# Patient Record
Sex: FEMALE | Race: WHITE | URBAN - METROPOLITAN AREA
[De-identification: names, ages, dates, MRNs, and addresses within clinical notes are randomized per-mention and may not be internally consistent; named-entity substitution may affect disease eponyms.]

---

## 2023-02-26 ENCOUNTER — INPATIENT (INPATIENT)
Facility: HOSPITAL | Age: 28
LOS: 0 days | Discharge: HOME | DRG: 465 | End: 2023-02-27
Attending: UROLOGY | Admitting: UROLOGY
Payer: MEDICAID

## 2023-02-26 VITALS
WEIGHT: 149.91 LBS | RESPIRATION RATE: 18 BRPM | HEART RATE: 87 BPM | DIASTOLIC BLOOD PRESSURE: 90 MMHG | SYSTOLIC BLOOD PRESSURE: 155 MMHG | TEMPERATURE: 99 F | OXYGEN SATURATION: 97 %

## 2023-02-26 DIAGNOSIS — N20.0 CALCULUS OF KIDNEY: ICD-10-CM

## 2023-02-26 DIAGNOSIS — Z98.891 HISTORY OF UTERINE SCAR FROM PREVIOUS SURGERY: Chronic | ICD-10-CM

## 2023-02-26 DIAGNOSIS — Z90.49 ACQUIRED ABSENCE OF OTHER SPECIFIED PARTS OF DIGESTIVE TRACT: Chronic | ICD-10-CM

## 2023-02-26 LAB
ALBUMIN SERPL ELPH-MCNC: 4.6 G/DL — SIGNIFICANT CHANGE UP (ref 3.5–5.2)
ALP SERPL-CCNC: 123 U/L — HIGH (ref 30–115)
ALT FLD-CCNC: 22 U/L — SIGNIFICANT CHANGE UP (ref 0–41)
ANION GAP SERPL CALC-SCNC: 9 MMOL/L — SIGNIFICANT CHANGE UP (ref 7–14)
APPEARANCE UR: ABNORMAL
AST SERPL-CCNC: 18 U/L — SIGNIFICANT CHANGE UP (ref 0–41)
BACTERIA # UR AUTO: NEGATIVE — SIGNIFICANT CHANGE UP
BASOPHILS # BLD AUTO: 0.02 K/UL — SIGNIFICANT CHANGE UP (ref 0–0.2)
BASOPHILS NFR BLD AUTO: 0.1 % — SIGNIFICANT CHANGE UP (ref 0–1)
BILIRUB SERPL-MCNC: 0.6 MG/DL — SIGNIFICANT CHANGE UP (ref 0.2–1.2)
BILIRUB UR-MCNC: NEGATIVE — SIGNIFICANT CHANGE UP
BUN SERPL-MCNC: 14 MG/DL — SIGNIFICANT CHANGE UP (ref 10–20)
CALCIUM SERPL-MCNC: 9.2 MG/DL — SIGNIFICANT CHANGE UP (ref 8.4–10.4)
CHLORIDE SERPL-SCNC: 100 MMOL/L — SIGNIFICANT CHANGE UP (ref 98–110)
CO2 SERPL-SCNC: 27 MMOL/L — SIGNIFICANT CHANGE UP (ref 17–32)
COLOR SPEC: ABNORMAL
CREAT SERPL-MCNC: 1.2 MG/DL — SIGNIFICANT CHANGE UP (ref 0.7–1.5)
DIFF PNL FLD: ABNORMAL
EGFR: 64 ML/MIN/1.73M2 — SIGNIFICANT CHANGE UP
EOSINOPHIL # BLD AUTO: 0.04 K/UL — SIGNIFICANT CHANGE UP (ref 0–0.7)
EOSINOPHIL NFR BLD AUTO: 0.3 % — SIGNIFICANT CHANGE UP (ref 0–8)
EPI CELLS # UR: 3 /HPF — SIGNIFICANT CHANGE UP (ref 0–5)
GLUCOSE SERPL-MCNC: 104 MG/DL — HIGH (ref 70–99)
GLUCOSE UR QL: NEGATIVE — SIGNIFICANT CHANGE UP
HCG SERPL QL: NEGATIVE — SIGNIFICANT CHANGE UP
HCT VFR BLD CALC: 37.5 % — SIGNIFICANT CHANGE UP (ref 37–47)
HGB BLD-MCNC: 13.2 G/DL — SIGNIFICANT CHANGE UP (ref 12–16)
HYALINE CASTS # UR AUTO: 0 /LPF — SIGNIFICANT CHANGE UP (ref 0–7)
IMM GRANULOCYTES NFR BLD AUTO: 0.3 % — SIGNIFICANT CHANGE UP (ref 0.1–0.3)
KETONES UR-MCNC: NEGATIVE — SIGNIFICANT CHANGE UP
LEUKOCYTE ESTERASE UR-ACNC: ABNORMAL
LIDOCAIN IGE QN: 32 U/L — SIGNIFICANT CHANGE UP (ref 7–60)
LYMPHOCYTES # BLD AUTO: 1.92 K/UL — SIGNIFICANT CHANGE UP (ref 1.2–3.4)
LYMPHOCYTES # BLD AUTO: 13 % — LOW (ref 20.5–51.1)
MCHC RBC-ENTMCNC: 29.7 PG — SIGNIFICANT CHANGE UP (ref 27–31)
MCHC RBC-ENTMCNC: 35.2 G/DL — SIGNIFICANT CHANGE UP (ref 32–37)
MCV RBC AUTO: 84.5 FL — SIGNIFICANT CHANGE UP (ref 81–99)
MONOCYTES # BLD AUTO: 0.93 K/UL — HIGH (ref 0.1–0.6)
MONOCYTES NFR BLD AUTO: 6.3 % — SIGNIFICANT CHANGE UP (ref 1.7–9.3)
NEUTROPHILS # BLD AUTO: 11.83 K/UL — HIGH (ref 1.4–6.5)
NEUTROPHILS NFR BLD AUTO: 80 % — HIGH (ref 42.2–75.2)
NITRITE UR-MCNC: NEGATIVE — SIGNIFICANT CHANGE UP
NRBC # BLD: 0 /100 WBCS — SIGNIFICANT CHANGE UP (ref 0–0)
PH UR: 6.5 — SIGNIFICANT CHANGE UP (ref 5–8)
PLATELET # BLD AUTO: 349 K/UL — SIGNIFICANT CHANGE UP (ref 130–400)
POTASSIUM SERPL-MCNC: 4.4 MMOL/L — SIGNIFICANT CHANGE UP (ref 3.5–5)
POTASSIUM SERPL-SCNC: 4.4 MMOL/L — SIGNIFICANT CHANGE UP (ref 3.5–5)
PROT SERPL-MCNC: 7.1 G/DL — SIGNIFICANT CHANGE UP (ref 6–8)
PROT UR-MCNC: ABNORMAL
RBC # BLD: 4.44 M/UL — SIGNIFICANT CHANGE UP (ref 4.2–5.4)
RBC # FLD: 11.4 % — LOW (ref 11.5–14.5)
RBC CASTS # UR COMP ASSIST: >720 /HPF — HIGH (ref 0–4)
SARS-COV-2 RNA SPEC QL NAA+PROBE: SIGNIFICANT CHANGE UP
SODIUM SERPL-SCNC: 136 MMOL/L — SIGNIFICANT CHANGE UP (ref 135–146)
SP GR SPEC: 1.02 — SIGNIFICANT CHANGE UP (ref 1.01–1.03)
UROBILINOGEN FLD QL: SIGNIFICANT CHANGE UP
WBC # BLD: 14.79 K/UL — HIGH (ref 4.8–10.8)
WBC # FLD AUTO: 14.79 K/UL — HIGH (ref 4.8–10.8)
WBC UR QL: 14 /HPF — HIGH (ref 0–5)

## 2023-02-26 PROCEDURE — 74176 CT ABD & PELVIS W/O CONTRAST: CPT | Mod: 26,MA

## 2023-02-26 PROCEDURE — 99285 EMERGENCY DEPT VISIT HI MDM: CPT | Mod: 25

## 2023-02-26 PROCEDURE — U0005: CPT

## 2023-02-26 PROCEDURE — C1758: CPT

## 2023-02-26 PROCEDURE — 85025 COMPLETE CBC W/AUTO DIFF WBC: CPT

## 2023-02-26 PROCEDURE — U0003: CPT

## 2023-02-26 PROCEDURE — 87086 URINE CULTURE/COLONY COUNT: CPT

## 2023-02-26 PROCEDURE — 76775 US EXAM ABDO BACK WALL LIM: CPT | Mod: 26

## 2023-02-26 PROCEDURE — 52332 CYSTOSCOPY AND TREATMENT: CPT | Mod: RT

## 2023-02-26 PROCEDURE — 99221 1ST HOSP IP/OBS SF/LOW 40: CPT | Mod: 25

## 2023-02-26 PROCEDURE — 36415 COLL VENOUS BLD VENIPUNCTURE: CPT

## 2023-02-26 PROCEDURE — 74420 UROGRAPHY RTRGR +-KUB: CPT | Mod: 26,RT

## 2023-02-26 PROCEDURE — 80048 BASIC METABOLIC PNL TOTAL CA: CPT

## 2023-02-26 PROCEDURE — 74018 RADEX ABDOMEN 1 VIEW: CPT

## 2023-02-26 PROCEDURE — C1769: CPT

## 2023-02-26 PROCEDURE — C2617: CPT

## 2023-02-26 RX ORDER — TAMSULOSIN HYDROCHLORIDE 0.4 MG/1
0.4 CAPSULE ORAL DAILY
Refills: 0 | Status: DISCONTINUED | OUTPATIENT
Start: 2023-02-26 | End: 2023-02-27

## 2023-02-26 RX ORDER — LISINOPRIL 2.5 MG/1
10 TABLET ORAL DAILY
Refills: 0 | Status: DISCONTINUED | OUTPATIENT
Start: 2023-02-26 | End: 2023-02-26

## 2023-02-26 RX ORDER — ONDANSETRON 8 MG/1
4 TABLET, FILM COATED ORAL EVERY 6 HOURS
Refills: 0 | Status: DISCONTINUED | OUTPATIENT
Start: 2023-02-26 | End: 2023-02-26

## 2023-02-26 RX ORDER — LISINOPRIL 2.5 MG/1
1 TABLET ORAL
Qty: 0 | Refills: 0 | DISCHARGE

## 2023-02-26 RX ORDER — ONDANSETRON 8 MG/1
4 TABLET, FILM COATED ORAL ONCE
Refills: 0 | Status: COMPLETED | OUTPATIENT
Start: 2023-02-26 | End: 2023-02-26

## 2023-02-26 RX ORDER — ACETAMINOPHEN 500 MG
650 TABLET ORAL EVERY 6 HOURS
Refills: 0 | Status: DISCONTINUED | OUTPATIENT
Start: 2023-02-26 | End: 2023-02-26

## 2023-02-26 RX ORDER — MORPHINE SULFATE 50 MG/1
4 CAPSULE, EXTENDED RELEASE ORAL EVERY 6 HOURS
Refills: 0 | Status: DISCONTINUED | OUTPATIENT
Start: 2023-02-26 | End: 2023-02-26

## 2023-02-26 RX ORDER — MORPHINE SULFATE 50 MG/1
4 CAPSULE, EXTENDED RELEASE ORAL ONCE
Refills: 0 | Status: DISCONTINUED | OUTPATIENT
Start: 2023-02-26 | End: 2023-02-26

## 2023-02-26 RX ORDER — KETOROLAC TROMETHAMINE 30 MG/ML
15 SYRINGE (ML) INJECTION EVERY 6 HOURS
Refills: 0 | Status: DISCONTINUED | OUTPATIENT
Start: 2023-02-26 | End: 2023-02-26

## 2023-02-26 RX ORDER — OXYCODONE HYDROCHLORIDE 5 MG/1
5 TABLET ORAL ONCE
Refills: 0 | Status: DISCONTINUED | OUTPATIENT
Start: 2023-02-26 | End: 2023-02-27

## 2023-02-26 RX ORDER — KETOROLAC TROMETHAMINE 30 MG/ML
15 SYRINGE (ML) INJECTION ONCE
Refills: 0 | Status: DISCONTINUED | OUTPATIENT
Start: 2023-02-26 | End: 2023-02-26

## 2023-02-26 RX ORDER — SODIUM CHLORIDE 9 MG/ML
1000 INJECTION, SOLUTION INTRAVENOUS
Refills: 0 | Status: DISCONTINUED | OUTPATIENT
Start: 2023-02-26 | End: 2023-02-27

## 2023-02-26 RX ORDER — ACETAMINOPHEN 500 MG
1000 TABLET ORAL ONCE
Refills: 0 | Status: COMPLETED | OUTPATIENT
Start: 2023-02-26 | End: 2023-02-27

## 2023-02-26 RX ORDER — SODIUM CHLORIDE 9 MG/ML
1000 INJECTION INTRAMUSCULAR; INTRAVENOUS; SUBCUTANEOUS ONCE
Refills: 0 | Status: COMPLETED | OUTPATIENT
Start: 2023-02-26 | End: 2023-02-26

## 2023-02-26 RX ORDER — SODIUM CHLORIDE 9 MG/ML
1000 INJECTION INTRAMUSCULAR; INTRAVENOUS; SUBCUTANEOUS
Refills: 0 | Status: DISCONTINUED | OUTPATIENT
Start: 2023-02-26 | End: 2023-02-27

## 2023-02-26 RX ORDER — KETOROLAC TROMETHAMINE 30 MG/ML
15 SYRINGE (ML) INJECTION EVERY 6 HOURS
Refills: 0 | Status: DISCONTINUED | OUTPATIENT
Start: 2023-02-26 | End: 2023-02-27

## 2023-02-26 RX ORDER — HYDROMORPHONE HYDROCHLORIDE 2 MG/ML
0.5 INJECTION INTRAMUSCULAR; INTRAVENOUS; SUBCUTANEOUS
Refills: 0 | Status: DISCONTINUED | OUTPATIENT
Start: 2023-02-26 | End: 2023-02-27

## 2023-02-26 RX ORDER — INFLUENZA VIRUS VACCINE 15; 15; 15; 15 UG/.5ML; UG/.5ML; UG/.5ML; UG/.5ML
0.5 SUSPENSION INTRAMUSCULAR ONCE
Refills: 0 | Status: DISCONTINUED | OUTPATIENT
Start: 2023-02-26 | End: 2023-02-27

## 2023-02-26 RX ORDER — SODIUM CHLORIDE 9 MG/ML
1000 INJECTION INTRAMUSCULAR; INTRAVENOUS; SUBCUTANEOUS
Refills: 0 | Status: DISCONTINUED | OUTPATIENT
Start: 2023-02-26 | End: 2023-02-26

## 2023-02-26 RX ADMIN — Medication 15 MILLIGRAM(S): at 06:24

## 2023-02-26 RX ADMIN — Medication 15 MILLIGRAM(S): at 20:10

## 2023-02-26 RX ADMIN — SODIUM CHLORIDE 1000 MILLILITER(S): 9 INJECTION INTRAMUSCULAR; INTRAVENOUS; SUBCUTANEOUS at 05:30

## 2023-02-26 RX ADMIN — Medication 15 MILLIGRAM(S): at 11:04

## 2023-02-26 RX ADMIN — ONDANSETRON 4 MILLIGRAM(S): 8 TABLET, FILM COATED ORAL at 11:05

## 2023-02-26 RX ADMIN — MORPHINE SULFATE 4 MILLIGRAM(S): 50 CAPSULE, EXTENDED RELEASE ORAL at 06:35

## 2023-02-26 RX ADMIN — MORPHINE SULFATE 4 MILLIGRAM(S): 50 CAPSULE, EXTENDED RELEASE ORAL at 08:53

## 2023-02-26 RX ADMIN — ONDANSETRON 4 MILLIGRAM(S): 8 TABLET, FILM COATED ORAL at 06:35

## 2023-02-26 RX ADMIN — SODIUM CHLORIDE 1000 MILLILITER(S): 9 INJECTION INTRAMUSCULAR; INTRAVENOUS; SUBCUTANEOUS at 09:10

## 2023-02-26 RX ADMIN — SODIUM CHLORIDE 125 MILLILITER(S): 9 INJECTION INTRAMUSCULAR; INTRAVENOUS; SUBCUTANEOUS at 17:20

## 2023-02-26 RX ADMIN — Medication 15 MILLIGRAM(S): at 19:33

## 2023-02-26 RX ADMIN — Medication 15 MILLIGRAM(S): at 05:30

## 2023-02-26 RX ADMIN — HYDROMORPHONE HYDROCHLORIDE 0.5 MILLIGRAM(S): 2 INJECTION INTRAMUSCULAR; INTRAVENOUS; SUBCUTANEOUS at 15:53

## 2023-02-26 RX ADMIN — SODIUM CHLORIDE 125 MILLILITER(S): 9 INJECTION INTRAMUSCULAR; INTRAVENOUS; SUBCUTANEOUS at 11:07

## 2023-02-26 RX ADMIN — TAMSULOSIN HYDROCHLORIDE 0.4 MILLIGRAM(S): 0.4 CAPSULE ORAL at 21:45

## 2023-02-26 RX ADMIN — ONDANSETRON 4 MILLIGRAM(S): 8 TABLET, FILM COATED ORAL at 15:52

## 2023-02-26 NOTE — PATIENT PROFILE ADULT - FUNCTIONAL ASSESSMENT - BASIC MOBILITY 1.
Chief Complaint   Patient presents with     Blood Draw     Labs drawn via CVC by RN. Caps changed. Due for a dressing change. VS taken.     Sandra Flores RN     4 = No assist / stand by assistance

## 2023-02-26 NOTE — PATIENT PROFILE ADULT - FALL HARM RISK - FACTORS NURSING JUDGEMENT
No
Patient more than 12 hours s/p frontal injury. C/o worsening "throbbing" headache. Normal neuro exam. Will get CT and refer to concussion center if negative

## 2023-02-26 NOTE — H&P ADULT - ASSESSMENT
28y/o F with intractable pain and moderate right hydroureteronephrosis 2/2 an 8 x 4 x 7 RIGHT distal ureteral stone     - Case discussed with Dr. Sal, plan as per attending   - Admit to UROLOGY  - Booked in OR for cystoscopy, placement of RIGHT JJ stent   - Continue NPO (NPO since last night)  - Flomax  - Analgesia, Antiemetics prn   - Strain all urine  - COVID swab was sent by ED .

## 2023-02-26 NOTE — ED PROVIDER NOTE - PHYSICAL EXAMINATION
CONSTITUTIONAL: Well-developed; well-nourished  SKIN: warm, dry  HEAD: Normocephalic; atraumatic.  EYES: PERRL, EOMI, normal sclera and conjunctiva   ENT: No nasal discharge; airway clear.  NECK: Supple; non tender.  CARD: S1, S2 normal; no murmurs, gallops, or rubs. Regular rate and rhythm.   RESP: No wheezes, rales or rhonchi.  ABD: soft ntnd  EXT: Normal ROM.  No clubbing, cyanosis or edema.   LYMPH: No acute cervical adenopathy.  NEURO: Alert, oriented, grossly unremarkable  PSYCH: Cooperative, appropriate.

## 2023-02-26 NOTE — BRIEF OPERATIVE NOTE - NSICDXBRIEFPROCEDURE_GEN_ALL_CORE_FT
PROCEDURES:  Cystoureteroscopy, with retrograde pyelogram and stent insertion 26-Feb-2023 14:45:34  Chilango AMAYA

## 2023-02-26 NOTE — PRE-ANESTHESIA EVALUATION ADULT - TEMPERATURE IN CELSIUS (DEGREES C)
Labs today - screening inflammation, metabolic, and autoimmune including thyroid and celiac disease.     KUB today - if large stool burden, will prescribe a clean out as below.     If H pylori is positive, will treat with antibiotics and PPI.     If H pylori negative, will start Nexium 40 mg once a day in the am. Take the medication at least 30 mins prior to eating. The medication works much better went taken on an empty stomach.     If pain persists after 4 weeks on the Nexium, will set up EGD.     Alli Allred MD    For constipation:  Treating constipation is two-fold - first you need to remove the stool burden that has already built up and then you need a good maintenance plan to prevent recurrence.      To remove the current stool burden, do a clean-out at home.    1. Mix 15 capfuls of Miralax with 64 ounces of Gatorade   2. Then take 2 square of Ex-Lax (30 mg senna) and repeat 2 square (30 mg senna) 2 hours later   3. Drink Miralax/Gatorade mixture over the next 2 hours.   (Can keep portion not being drank in the fridge, or else it becomes a little grainy.)    This plan will make her poop for a few hours - you should do this on a day when she can be near home and near a bathroom.  4. This process may need to be repeated every day for up to a total of 3 days.  The goal is to have chicken broth consistency stools.    For Patricia's constipation maintenance plan (i.e.daily plan to prevent constipation), you should:   · Miralax 1 capful per day  · Goal is a soft, easy to pass BM at least once daily.   (Poop that looks like logs with cracks, bumpy logs, or individual balls/pellets of stools is too constipated)   · This is a safe medication that you can easily adjust at home to achieve the desired effect.        To decrease the dose for loose stools, decrease by 1/2 capful per day no more often than every 3 days as needed.        To increase the dose for hard stool, you can increase by 1/2 to 1 full capful per  day every day as needed.      · Patricia will need to be on Miralax daily to prevent constipation for a minimum of 3-6 months; if after that time, her diet and fluid intake have changed and she is not needing very much Miralax (1/2 capful per day or less), we can try discontinuing this medication.     · If Patricia continues to require more than 1 capful of Miralax per day after 3-6 months, we should reassess his constipation and make sure there's not something more going on.    Behaviors to help treat constipation   There is a natural reflex in the body to have a bowel movement after eating. To take advantage of this, Patricia  should sit on the toilet for at least 5 minutes after every meal.       High Fiber diet: Patricia needs a high fiber diet.  Ideally, this comes from a diet high in fruits, vegetables, and whole grains versus from a fiber supplement or product.  Try to eat at least 5 servings of fruits or vegetables per day.  Focus on choosing a fruit or vegetable with every meal and snack.    Limit dairy to 2-3 servings (<24oz) daily.    Fluids: Encourage your child to drink fluids throughout the day.  It can be helpful to have a water bottle at home and school to encourage good fluid intake. Goal is usually > 50 oz a day.     Remember the 5-2-1-0 rule for good health! Try to eat 5 servings of fruits or vegetables every day, no more than 2 hours of screen time daily (includes TV, video games and hand held games/phone), and one hour of physical activity daily!    Check out the website www.health.org/ee8645 for more resources on healthy eating and activity.    For more information, consider watching this 5 minute video: www.gikids.org \"The Poo In You.\" It explains the problems with constipation and how to fix it.  Have your child watch this with you.     37.3

## 2023-02-26 NOTE — ED ADULT TRIAGE NOTE - CHIEF COMPLAINT QUOTE
pt c/o right sided flank pain. pt was told she has two kidney stones last about 2 weeks ago and the pain feel similar.

## 2023-02-26 NOTE — CHART NOTE - NSCHARTNOTEFT_GEN_A_CORE
PACU ANESTHESIA ADMISSION NOTE      Procedure: Cystoureteroscopy, with retrograde pyelogram and stent insertion      Post op diagnosis:  Right ureteral calculus    Renal colic on right side      __x__  Patent Airway    _x___  Full return of protective reflexes    ____  Full recovery from anesthesia / back to baseline     Vitals:  see anesthesia record    Mental Status:  _x___ Awake   _____ Alert   _____ Drowsy   _____ Sedated    Nausea/Vomiting:  __x__ NO  ______Yes,   See Post - Op Orders          Pain Scale (0-10):  _____    Treatment: ____ None    ___x_ See Post - Op/PCA Orders    Post - Operative Fluids:   ____ Oral   __x__ See Post - Op Orders    Plan: Discharge:   _x___Home       _____Floor     _____Critical Care    _____  Other:_________________    Comments:  Uneventful intraoperative course. No anesthesia issues or complications noted. Patient stable upon arrival to PACU. Report given to RN. Discharge when criteria met.

## 2023-02-26 NOTE — ED ADULT NURSE NOTE - OBJECTIVE STATEMENT
pt c/o right sided flank pain, pain feels similar to when she had gallstones. Pt also has menstrual cycle right now, so unsure if pain is related to that.

## 2023-02-26 NOTE — ED PROVIDER NOTE - PROGRESS NOTE DETAILS
27-year-old female presented today with right-sided flank pain with suspected kidney stone.  Patient is hemodynamically stable and well-appearing on evaluation.  Patient has leukocytosis on labs and has continued abdominal pain despite medication administration.  Patient's bedside ultrasound was indicative of right-sided renal calyces dilation and a CT scan was ordered to evaluate for underlying etiology of possible large stone.  Patient will be signed out pending CAT scan results and disposition. ccruz - pt signed out to me by Dr Jerome. p/w R flank pain and n/v. pain resolved now, feels better, pending CT and UA re-eval.  consult placed on teams. pain returned, morphine ordered. 0.8 x0.4 x 0.7 distal ureter stone. UA pending, another IVF bolus ordered. UA large leuks, d/w DOROTEO CARDOZO [id number], states no abx for now. will give intraop.

## 2023-02-26 NOTE — BRIEF OPERATIVE NOTE - NSICDXBRIEFPREOP_GEN_ALL_CORE_FT
PRE-OP DIAGNOSIS:  Right ureteral calculus 26-Feb-2023 14:46:22  Chilango AMAYA  Renal colic on right side 26-Feb-2023 14:47:09  Chilango AMAYA

## 2023-02-26 NOTE — PATIENT PROFILE ADULT - FALL HARM RISK - RISK INTERVENTIONS
Assistance OOB with selected safe patient handling equipment/Assistance with ambulation/Communicate Fall Risk and Risk Factors to all staff, patient, and family/Monitor gait and stability/Reinforce activity limits and safety measures with patient and family/Sit up slowly, dangle for a short time, stand at bedside before walking/Use of alarms - bed, chair and/or voice tab/Visual Cue: Yellow wristband/Bed in lowest position, wheels locked, appropriate side rails in place/Call bell, personal items and telephone in reach/Instruct patient to call for assistance before getting out of bed or chair/Non-slip footwear when patient is out of bed/[place] to call system/Physically safe environment - no spills, clutter or unnecessary equipment/Purposeful Proactive Rounding/Room/bathroom lighting operational, light cord in reach

## 2023-02-26 NOTE — H&P ADULT - NSHPLABSRESULTS_GEN_ALL_CORE
13.2   14.79 )-----------( 349      ( 2023 05:50 )             37.5     136  |  100  |  14  ----------------------------<  104<H>  4.4   |  27  |  1.2    Ca    9.2      2023 05:50  TPro  7.1  /  Alb  4.6  /  TBili  0.6  /  DBili  x   /  AST  18  /  ALT  22  /  AlkPhos  123<H>      Urinalysis Basic - ( 2023 09:02 )  Color: Light Orange / Appearance: Slightly Turbid / S.023 / pH: x  Gluc: x / Ketone: Negative  / Bili: Negative / Urobili: <2 mg/dL   Blood: x / Protein: 100 mg/dL / Nitrite: Negative   Leuk Esterase: Large / RBC: x / WBC x   Sq Epi: x / Non Sq Epi: x / Bacteria: x    ACC: [id number] EXAM:  CT ABDOMEN AND PELVIS   ORDERED BY: CEDRIC CHING     PROCEDURE DATE:  2023    INTERPRETATION:  CLINICAL STATEMENT: Right flank pain.  TECHNIQUE: Contiguous axial CT images were obtained from the lower chest to the pubic symphysis with IV contrast administration.  Oral contrast was not administered.  Reformatted images in the coronal and sagittal planes were acquired.  Comparison: None.    FINDINGS:  LOWER CHEST: Unremarkable.  HEPATOBILIARY: Post cholecystectomy. Unenhanced liver unremarkable. No biliary dilation.  SPLEEN: Unremarkable.  PANCREAS: Unremarkable.  ADRENAL GLANDS: Unremarkable.  KIDNEYS:  Moderate right hydroureteronephrosis secondary to a right distal ureter 0.8 x 0.4 x 0.7 cm calculus (average Hounsfield units 1300).No left hydronephrosis. Nonobstructing 0.2 cm left renal calculus.  ABDOMINOPELVIC NODES: Unremarkable.  PELVIC ORGANS: Unremarkable.  PERITONEUM/MESENTERY/BOWEL: Unremarkable.  BONES/SOFT TISSUES: Unremarkable.    IMPRESSION:  Moderate right hydroureteronephrosis secondary to a right distal ureter 0.8 x 0.4 x 0.7 cm calculus.    JONH ESCUDERO MD; Attending Radiologist  This document has been electronically signed. 2023  7:55AM

## 2023-02-26 NOTE — BRIEF OPERATIVE NOTE - NSICDXBRIEFPOSTOP_GEN_ALL_CORE_FT
POST-OP DIAGNOSIS:  Right ureteral calculus 26-Feb-2023 14:47:22  Chilango AMAYA  Renal colic on right side 26-Feb-2023 14:47:34  Chilango AMAYA

## 2023-02-26 NOTE — H&P ADULT - HISTORY OF PRESENT ILLNESS
Pt is a 26y/o F presenting with one day h/o RIGHT sided flank pain a/w vomiting and hematuria, denies fever, chills, dysuria, urgency, frequency at this time. Went to GYN for vaginal bleeding two weeks ago and got CT which showed no GYN cause but noted "renal pelvic stone" as per patient.

## 2023-02-26 NOTE — ED PROVIDER NOTE - OBJECTIVE STATEMENT
27 year old female presenting today with right sided flank pain. Pt reports symptoms have been ongoing over the past day. Reports right sided pain similar to her previous kidney stones which were noted on a CT scan done performed 2 weeks ago. Denies fevers, chills, abd pain.

## 2023-02-26 NOTE — H&P ADULT - NSHPPHYSICALEXAM_GEN_ALL_CORE
Vital Signs Last 24 Hrs  T(C): 37.3 (26 Feb 2023 07:29), Max: 37.3 (26 Feb 2023 05:15)  T(F): 99.1 (26 Feb 2023 07:29), Max: 99.2 (26 Feb 2023 05:15)  HR: 74 (26 Feb 2023 07:29) (74 - 87)  BP: 134/84 (26 Feb 2023 07:29) (134/84 - 155/90)  RR: 18 (26 Feb 2023 07:29) (18 - 18)  SpO2: 99% (26 Feb 2023 07:29) (97% - 99%)    PHYSICAL EXAM:  GEN: NAD, awake and alert.  SKIN: Good color, non diaphoretic.  RESP: Non-labored breathing. No use of accessory muscles.  CARDIO: +S1/S2  ABDO: Soft, NT/ND  BACK: mild RIGHT CVAT  : No SP tenderness, bladder nonpalpable, voiding freely  EXT: DIAZ x 4

## 2023-02-26 NOTE — H&P ADULT - NS ATTEND AMEND GEN_ALL_CORE FT
chart reviewed and pt seen  -onur w/ persistent right back pain .  -CT scan reviewed == 8mm right mid -ureteral stone w/ mod hydronephrosis   -case discussed w/ pt == advise cysto w/ insertion right ureteral stent under GA  == pt agrees   informed consent obtained including not limiting to pain, bleeding, risks, postop course, success,, failures, catheters, stents,, UTI sepsis,, future procedures, transfusions,, alternatives, unforeseen complications such as MI, CVA, pulmonary embolus, DVT., secondary procedures,  post-op course, expectations, methodology  of the procedure.

## 2023-02-27 ENCOUNTER — TRANSCRIPTION ENCOUNTER (OUTPATIENT)
Age: 28
End: 2023-02-27

## 2023-02-27 VITALS
HEART RATE: 67 BPM | TEMPERATURE: 97 F | SYSTOLIC BLOOD PRESSURE: 128 MMHG | DIASTOLIC BLOOD PRESSURE: 63 MMHG | OXYGEN SATURATION: 97 % | RESPIRATION RATE: 18 BRPM

## 2023-02-27 LAB
ANION GAP SERPL CALC-SCNC: 9 MMOL/L — SIGNIFICANT CHANGE UP (ref 7–14)
BASOPHILS # BLD AUTO: 0.02 K/UL — SIGNIFICANT CHANGE UP (ref 0–0.2)
BASOPHILS NFR BLD AUTO: 0.2 % — SIGNIFICANT CHANGE UP (ref 0–1)
BUN SERPL-MCNC: 19 MG/DL — SIGNIFICANT CHANGE UP (ref 10–20)
CALCIUM SERPL-MCNC: 8.2 MG/DL — LOW (ref 8.4–10.5)
CHLORIDE SERPL-SCNC: 105 MMOL/L — SIGNIFICANT CHANGE UP (ref 98–110)
CO2 SERPL-SCNC: 24 MMOL/L — SIGNIFICANT CHANGE UP (ref 17–32)
CREAT SERPL-MCNC: 1.1 MG/DL — SIGNIFICANT CHANGE UP (ref 0.7–1.5)
EGFR: 71 ML/MIN/1.73M2 — SIGNIFICANT CHANGE UP
EOSINOPHIL # BLD AUTO: 0.01 K/UL — SIGNIFICANT CHANGE UP (ref 0–0.7)
EOSINOPHIL NFR BLD AUTO: 0.1 % — SIGNIFICANT CHANGE UP (ref 0–8)
GLUCOSE SERPL-MCNC: 96 MG/DL — SIGNIFICANT CHANGE UP (ref 70–99)
HCT VFR BLD CALC: 34.3 % — LOW (ref 37–47)
HGB BLD-MCNC: 11.6 G/DL — LOW (ref 12–16)
IMM GRANULOCYTES NFR BLD AUTO: 0.4 % — HIGH (ref 0.1–0.3)
LYMPHOCYTES # BLD AUTO: 1.7 K/UL — SIGNIFICANT CHANGE UP (ref 1.2–3.4)
LYMPHOCYTES # BLD AUTO: 16.2 % — LOW (ref 20.5–51.1)
MCHC RBC-ENTMCNC: 29.4 PG — SIGNIFICANT CHANGE UP (ref 27–31)
MCHC RBC-ENTMCNC: 33.8 G/DL — SIGNIFICANT CHANGE UP (ref 32–37)
MCV RBC AUTO: 86.8 FL — SIGNIFICANT CHANGE UP (ref 81–99)
MONOCYTES # BLD AUTO: 0.76 K/UL — HIGH (ref 0.1–0.6)
MONOCYTES NFR BLD AUTO: 7.3 % — SIGNIFICANT CHANGE UP (ref 1.7–9.3)
NEUTROPHILS # BLD AUTO: 7.95 K/UL — HIGH (ref 1.4–6.5)
NEUTROPHILS NFR BLD AUTO: 75.8 % — HIGH (ref 42.2–75.2)
NRBC # BLD: 0 /100 WBCS — SIGNIFICANT CHANGE UP (ref 0–0)
PLATELET # BLD AUTO: 286 K/UL — SIGNIFICANT CHANGE UP (ref 130–400)
POTASSIUM SERPL-MCNC: 4.6 MMOL/L — SIGNIFICANT CHANGE UP (ref 3.5–5)
POTASSIUM SERPL-SCNC: 4.6 MMOL/L — SIGNIFICANT CHANGE UP (ref 3.5–5)
RBC # BLD: 3.95 M/UL — LOW (ref 4.2–5.4)
RBC # FLD: 11.6 % — SIGNIFICANT CHANGE UP (ref 11.5–14.5)
SODIUM SERPL-SCNC: 138 MMOL/L — SIGNIFICANT CHANGE UP (ref 135–146)
WBC # BLD: 10.48 K/UL — SIGNIFICANT CHANGE UP (ref 4.8–10.8)
WBC # FLD AUTO: 10.48 K/UL — SIGNIFICANT CHANGE UP (ref 4.8–10.8)

## 2023-02-27 RX ORDER — SENNA PLUS 8.6 MG/1
1 TABLET ORAL DAILY
Refills: 0 | Status: DISCONTINUED | OUTPATIENT
Start: 2023-02-27 | End: 2023-02-27

## 2023-02-27 RX ORDER — ACETAMINOPHEN 500 MG
650 TABLET ORAL ONCE
Refills: 0 | Status: COMPLETED | OUTPATIENT
Start: 2023-02-27 | End: 2023-02-27

## 2023-02-27 RX ORDER — ACETAMINOPHEN 500 MG
2 TABLET ORAL
Qty: 0 | Refills: 0 | DISCHARGE
Start: 2023-02-27

## 2023-02-27 RX ORDER — FAMOTIDINE 10 MG/ML
20 INJECTION INTRAVENOUS DAILY
Refills: 0 | Status: DISCONTINUED | OUTPATIENT
Start: 2023-02-27 | End: 2023-02-27

## 2023-02-27 RX ORDER — CEFPODOXIME PROXETIL 100 MG
1 TABLET ORAL
Qty: 14 | Refills: 0
Start: 2023-02-27 | End: 2023-03-05

## 2023-02-27 RX ORDER — TAMSULOSIN HYDROCHLORIDE 0.4 MG/1
1 CAPSULE ORAL
Qty: 30 | Refills: 0
Start: 2023-02-27 | End: 2023-03-28

## 2023-02-27 RX ORDER — PANTOPRAZOLE SODIUM 20 MG/1
1 TABLET, DELAYED RELEASE ORAL
Qty: 14 | Refills: 0
Start: 2023-02-27 | End: 2023-03-12

## 2023-02-27 RX ORDER — DOCUSATE SODIUM 100 MG
1 CAPSULE ORAL
Qty: 14 | Refills: 0
Start: 2023-02-27 | End: 2023-03-12

## 2023-02-27 RX ADMIN — OXYCODONE HYDROCHLORIDE 5 MILLIGRAM(S): 5 TABLET ORAL at 02:41

## 2023-02-27 RX ADMIN — FAMOTIDINE 20 MILLIGRAM(S): 10 INJECTION INTRAVENOUS at 11:35

## 2023-02-27 RX ADMIN — TAMSULOSIN HYDROCHLORIDE 0.4 MILLIGRAM(S): 0.4 CAPSULE ORAL at 11:35

## 2023-02-27 RX ADMIN — Medication 1000 MILLIGRAM(S): at 11:39

## 2023-02-27 RX ADMIN — Medication 1000 MILLIGRAM(S): at 11:35

## 2023-02-27 RX ADMIN — SENNA PLUS 1 TABLET(S): 8.6 TABLET ORAL at 11:35

## 2023-02-27 NOTE — DISCHARGE NOTE PROVIDER - HOSPITAL COURSE
This is a 27 year old female admitted with intractable pain and moderate right hydronephrosis secondary to a [id number] right distal ureteral stone. Patient was admitted to the hospital and taken to the OR and is now s/p cystoscopy, right JJ stent placement. Patient tolerated procedure well and was extubated without issue. She is tolerating a diet, vital signs are stable, labs are within normal limits, pain is well controlled, and she is ambulating and voiding without issue. Patient is stable for discharge home with outpatient follow-up with Dr. Sal.

## 2023-02-27 NOTE — DISCHARGE NOTE PROVIDER - NSDCCPCAREPLAN_GEN_ALL_CORE_FT
PRINCIPAL DISCHARGE DIAGNOSIS  Diagnosis: Kidney stone  Assessment and Plan of Treatment: s/p cystoscopy, right ureteral stent placement  -Diet: Continue regular diet as tolerated.  -Activity: You may ambulate and otherwise resume normal daily activities as tolerated.   -Medications: You may resume your home medications. You may take Tylenol 650mg every 6 hours and alternate with Ibuprofen 600mg every 8 hours for pain. Prescriptions have been sent to [facility] (located on the first floor of the hospital) for the following medications: Tamsulosin 1 capsule once daily, Colace 1 capsule once daily as needed for constipation, and Pantoprazole 1 tablet once daily as needed for heartburn. Please take these medications as intructed on the bottle.   -Follow-up: Please call the office to schedule a follow-up appointment with Dr. Sal within 2 weeks.  -Please call the office or return to the ED with persistent fever greater than 100.4F, chest pain, shortness of breath, uncontrollable nausea/vomiting/abdominal pain, abdominal distention, inability to urinate.       PRINCIPAL DISCHARGE DIAGNOSIS  Diagnosis: Kidney stone  Assessment and Plan of Treatment: s/p cystoscopy, right ureteral stent placement  -Diet: Continue regular diet as tolerated.  -Activity: You may ambulate and otherwise resume normal daily activities as tolerated.   -Medications: You may resume your home medications. You may take Tylenol 650mg every 6 hours and alternate with Ibuprofen 600mg every 8 hours for pain. Prescriptions have been sent to [facility] (located on the first floor of the hospital) for the following medications: Cefpodoxime 1 tablet every 12 hours x 7 days, Tamsulosin 1 capsule once daily x 1 month, Colace 1 capsule once daily as needed for constipation, and Pantoprazole 1 tablet once daily as needed for heartburn. Please take these medications as intructed on the bottle.   -Follow-up: Please call the office to schedule a follow-up appointment with Dr. Sal within 2 weeks.  -Please call the office or return to the ED with persistent fever greater than 100.4F, chest pain, shortness of breath, uncontrollable nausea/vomiting/abdominal pain, abdominal distention, inability to urinate.

## 2023-02-27 NOTE — DISCHARGE NOTE NURSING/CASE MANAGEMENT/SOCIAL WORK - PATIENT PORTAL LINK FT
You can access the FollowMyHealth Patient Portal offered by [facility] by registering at the following website: [email or web address]. By joining [employer]’s FollowMyHealth portal, you will also be able to view your health information using other applications (apps) compatible with our system.

## 2023-02-27 NOTE — DISCHARGE NOTE PROVIDER - CARE PROVIDER_API CALL
Chilango Sal)  Urology  [address] [place] [address]  Phone: [phone]  Fax: [phone]  Follow Up Time: 2 weeks

## 2023-02-27 NOTE — DISCHARGE NOTE PROVIDER - NSDCMRMEDTOKEN_GEN_ALL_CORE_FT
acetaminophen 500 mg oral tablet: 2 tab(s) orally once  Colace 100 mg oral capsule: 1 cap(s) orally once a day, As Needed -for constipation MDD:1 capsule  lisinopril 10 mg oral tablet: 1 tab(s) orally once a day  pantoprazole 40 mg oral delayed release tablet: 1 tab(s) orally once a day, As Needed -for heartburn MDD:1 tablet  tamsulosin 0.4 mg oral capsule: 1 cap(s) orally once a day MDD:1 capsule   acetaminophen 500 mg oral tablet: 2 tab(s) orally once  cefpodoxime 200 mg oral tablet: 1 tab(s) orally every 12 hours MDD:2 tablets  Colace 100 mg oral capsule: 1 cap(s) orally once a day, As Needed -for constipation MDD:1 capsule  lisinopril 10 mg oral tablet: 1 tab(s) orally once a day  pantoprazole 40 mg oral delayed release tablet: 1 tab(s) orally once a day, As Needed -for heartburn MDD:1 tablet  tamsulosin 0.4 mg oral capsule: 1 cap(s) orally once a day MDD:1 capsule

## 2023-02-27 NOTE — PROGRESS NOTE ADULT - SUBJECTIVE AND OBJECTIVE BOX
UROLOGY: Pt is a 28y/o F s/p cystoscopy, placement of RIGHT JJ stent POD # 1. Pt seen and examined at bedside. States feels slightly nauseous. Denies fevers, chills at this time .    REVIEW OF SYSTEMS   [x] A ten-point review of systems was otherwise negative except as noted.    Vital Signs Last 24 Hrs  T(C): 36.8 (26 Feb 2023 17:00), Max: 37.3 (26 Feb 2023 05:15)  T(F): 98.2 (26 Feb 2023 17:00), Max: 99.2 (26 Feb 2023 05:15)  HR: 95 (26 Feb 2023 17:00) (68 - 96)  BP: 130/86 (26 Feb 2023 17:00) (108/58 - 155/90)  RR: 18 (26 Feb 2023 17:00) (16 - 18)  SpO2: 97% (26 Feb 2023 17:00) (97% - 100%)    PHYSICAL EXAM:  GEN: NAD, awake and alert.  SKIN: Good color, non diaphoretic.  RESP: Non-labored breathing. No use of accessory muscles.  CARDIO: +S1/S2  ABDO: Soft, NT/ND  BACK: No CVAT B/L  : voiding freely  EXT: DIAZ x 4    LABS:             13.2   14.79 )-----------( 349      ( 26 Feb 2023 05:50 )             37.5     136  |  100  |  14  ----------------------------<  104<H>  4.4   |  27  |  1.2    Ca    9.2      26 Feb 2023 05:50  TPro  7.1  /  Alb  4.6  /  TBili  0.6  /  DBili  x   /  AST  18  /  ALT  22  /  AlkPhos  123<H>  02-26
UROLOGY DAILY PROGRESS NOTE    Pt is a 28y/o F s/p cystoscopy, placement of RIGHT JJ stent POD # 2.   Pt seen and examined at bedside in NAD, reports upper abdominal pain, still has hematuria but unsure of it since she is menstruating. Denies fevers, chills at this time .         MEDICATIONS  (STANDING):  acetaminophen     Tablet .. 1000 milliGRAM(s) Oral once  influenza   Vaccine 0.5 milliLiter(s) IntraMuscular once  lactated ringers. 1000 milliLiter(s) (100 mL/Hr) IV Continuous <Continuous>  sodium chloride 0.9%. 1000 milliLiter(s) (125 mL/Hr) IV Continuous <Continuous>  tamsulosin 0.4 milliGRAM(s) Oral daily    MEDICATIONS  (PRN):  HYDROmorphone  Injectable 0.5 milliGRAM(s) IV Push every 10 minutes PRN Severe Pain (7 - 10)  ketorolac   Injectable 15 milliGRAM(s) IV Push every 6 hours PRN Moderate Pain (4 - 6)      REVIEW OF SYSTEMS   [x] A ten-point review of systems was otherwise negative except as noted.     Vital Signs Last 24 Hrs  T(C): 36.6 (27 Feb 2023 08:51), Max: 37.3 (26 Feb 2023 12:21)  T(F): 97.9 (27 Feb 2023 08:51), Max: 98.4 (26 Feb 2023 13:00)  HR: 82 (27 Feb 2023 08:51) (68 - 96)  BP: 135/68 (27 Feb 2023 08:51) (104/60 - 152/68)  RR: 18 (27 Feb 2023 08:51) (16 - 18)  SpO2: 100% (27 Feb 2023 08:51) (97% - 100%)    Parameters below as of 27 Feb 2023 04:15  Patient On (Oxygen Delivery Method): room air        PHYSICAL EXAM:  GEN: NAD, awake and alert.  SKIN: Good color, non diaphoretic.  ABDO: Soft, NT/ND, no palpable bladder, no suprapubic tenderness.   BACK: No CVAT B/L  EXT: DIAZ x 4      I&O's Summary    26 Feb 2023 07:01  -  27 Feb 2023 07:00  --------------------------------------------------------  IN: 375 mL / OUT: 0 mL / NET: 375 mL        LABS:                        11.6   10.48 )-----------( 286      ( 27 Feb 2023 06:03 )             34.3     02-27    138  |  105  |  19  ----------------------------<  96  4.6   |  24  |  1.1    Ca    8.2<L>      27 Feb 2023 06:03    TPro  7.1  /  Alb  4.6  /  TBili  0.6  /  DBili  x   /  AST  18  /  ALT  22  /  AlkPhos  123<H>  02-26       Urinalysis (02.26.23 @ 09:02)    pH Urine: 6.5    Glucose Qualitative, Urine: Negative    Blood, Urine: Large    Color: Light Orange    Urine Appearance: Slightly Turbid    Bilirubin: Negative    Ketone - Urine: Negative    Specific Gravity: 1.023    Protein, Urine: 100 mg/dL    Urobilinogen: <2 mg/dL    Nitrite: Negative    Leukocyte Esterase Concentration: Large    Urine Microscopic-Add On (NC) (02.26.23 @ 09:02)    Red Blood Cell - Urine: >720 /HPF    White Blood Cell - Urine: 14 /HPF    Hyaline Casts: 0 /LPF    Bacteria: Negative    Epithelial Cells: 3 /HPF          RADIOLOGY & ADDITIONAL STUDIES:  < from: CT Abdomen and Pelvis No Cont (02.26.23 @ 08:16) >    ACC: [id number] EXAM:  CT ABDOMEN AND PELVIS   ORDERED BY: CEDRIC CHING     PROCEDURE DATE:  02/26/2023          INTERPRETATION:  CLINICAL STATEMENT: Right flank pain.    TECHNIQUE: Contiguous axial CT images were obtained from the lower chest   to the pubic symphysis with IV contrast administration.  Oral contrast   was not administered.  Reformatted images in the coronal and sagittal   planes were acquired.    Comparison: None.    FINDINGS:    LOWER CHEST: Unremarkable.    HEPATOBILIARY: Post cholecystectomy. Unenhanced liver unremarkable. No   biliary dilation.    SPLEEN: Unremarkable.    PANCREAS: Unremarkable.    ADRENAL GLANDS: Unremarkable.    KIDNEYS:  Moderate right hydroureteronephrosis secondary to a right distal ureter   0.8 x 0.4 x 0.7 cm calculus (average Hounsfield units 1300).  No left hydronephrosis. Nonobstructing 0.2 cm left renal calculus.    ABDOMINOPELVIC NODES: Unremarkable.    PELVIC ORGANS: Unremarkable.    PERITONEUM/MESENTERY/BOWEL: Unremarkable.    BONES/SOFT TISSUES: Unremarkable.      IMPRESSION:  Moderate right hydroureteronephrosis secondary to a right distal ureter   0.8 x 0.4 x 0.7 cm calculus.    --- End of Report ---      JONH ESCUDERO MD; Attending Radiologist  This document has been electronically signed. Feb 26 2023  7:55AM    < end of copied text >

## 2023-02-27 NOTE — PROGRESS NOTE ADULT - ASSESSMENT
28y/o F s/p cystoscopy, placement of RIGHT JJ stent POD # 2, doing well, afebrile, admited to urology for 24 hour observation.       Plan:   Analgesia prn x pain   OOB to chair ambulate   DVT/GI ppx   Cont. Incentive spirometry   Regular diet   Anticipate d/c home today     
26y/o F s/p cystoscopy, placement of RIGHT JJ stent POD # 1.    - Admit for overnight observation, anticipate d/c home tomorrow morning  - DVT/GI ppx   - Incentive spirometry  - OOB-chair, ambulate as tolerated   - Regular diet   - D/w Dr. Sal .

## 2023-02-28 PROBLEM — Z00.00 ENCOUNTER FOR PREVENTIVE HEALTH EXAMINATION: Status: ACTIVE | Noted: 2023-02-28

## 2023-02-28 LAB
CULTURE RESULTS: NO GROWTH — SIGNIFICANT CHANGE UP
SPECIMEN SOURCE: SIGNIFICANT CHANGE UP

## 2023-03-02 ENCOUNTER — APPOINTMENT (OUTPATIENT)
Dept: UROLOGY | Facility: CLINIC | Age: 28
End: 2023-03-02

## 2023-03-06 DIAGNOSIS — Z20.822 CONTACT WITH AND (SUSPECTED) EXPOSURE TO COVID-19: ICD-10-CM

## 2023-03-06 DIAGNOSIS — Z28.21 IMMUNIZATION NOT CARRIED OUT BECAUSE OF PATIENT REFUSAL: ICD-10-CM

## 2023-03-06 DIAGNOSIS — N13.2 HYDRONEPHROSIS WITH RENAL AND URETERAL CALCULOUS OBSTRUCTION: ICD-10-CM

## 2023-03-15 ENCOUNTER — NON-APPOINTMENT (OUTPATIENT)
Age: 28
End: 2023-03-15

## 2023-03-15 ENCOUNTER — APPOINTMENT (OUTPATIENT)
Dept: UROLOGY | Facility: CLINIC | Age: 28
End: 2023-03-15

## 2023-03-28 ENCOUNTER — APPOINTMENT (OUTPATIENT)
Dept: UROLOGY | Facility: CLINIC | Age: 28
End: 2023-03-28

## 2025-04-14 NOTE — ED PROVIDER NOTE - ADMIT DISPOSITION PRESENT ON ADMISSION SEPSIS
The pharmacy is requesting a refill of the below medication which has been pended for you:     Requested Prescriptions     Pending Prescriptions Disp Refills    metoprolol succinate (TOPROL XL) 25 MG extended release tablet [Pharmacy Med Name: METOPROLOL SUCC ER 25 MG TAB] 90 tablet 1     Sig: TAKE 1 TABLET BY MOUTH EVERY DAY       Last Appointment Date: 12/4/2024  Next Appointment Date: 4/18/2025    No Known Allergies   No